# Patient Record
Sex: MALE | Race: WHITE
[De-identification: names, ages, dates, MRNs, and addresses within clinical notes are randomized per-mention and may not be internally consistent; named-entity substitution may affect disease eponyms.]

---

## 2018-06-09 ENCOUNTER — HOSPITAL ENCOUNTER (EMERGENCY)
Dept: HOSPITAL 25 - ED | Age: 28
Discharge: HOME | End: 2018-06-09
Payer: COMMERCIAL

## 2018-06-09 VITALS — DIASTOLIC BLOOD PRESSURE: 77 MMHG | SYSTOLIC BLOOD PRESSURE: 137 MMHG

## 2018-06-09 DIAGNOSIS — M25.561: Primary | ICD-10-CM

## 2018-06-09 PROCEDURE — 96372 THER/PROPH/DIAG INJ SC/IM: CPT

## 2018-06-09 PROCEDURE — 99282 EMERGENCY DEPT VISIT SF MDM: CPT

## 2018-06-09 NOTE — RAD
Indication: RIGHT knee pain post fall. Known anterior cruciate ligament tear.



Comparison: None.



Technique: RIGHT knee: AP, tunnel, crosstable lateral, sunrise views. 



Report: Negative for joint effusion, fracture, or malalignment. Preserved joint spaces.

Unremarkable soft tissue contours.



IMPRESSION:  Negative exam.

## 2018-07-06 ENCOUNTER — HOSPITAL ENCOUNTER (OUTPATIENT)
Dept: HOSPITAL 25 - OREAST | Age: 28
Discharge: HOME | End: 2018-07-06
Attending: ORTHOPAEDIC SURGERY
Payer: COMMERCIAL

## 2018-07-06 VITALS — SYSTOLIC BLOOD PRESSURE: 128 MMHG | DIASTOLIC BLOOD PRESSURE: 72 MMHG

## 2018-07-06 DIAGNOSIS — S83.511A: Primary | ICD-10-CM
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
## 2018-07-06 DIAGNOSIS — S83.281A: ICD-10-CM

## 2018-07-06 DIAGNOSIS — Z72.0: ICD-10-CM

## 2018-07-06 PROCEDURE — 88304 TISSUE EXAM BY PATHOLOGIST: CPT

## 2018-07-06 PROCEDURE — C1713 ANCHOR/SCREW BN/BN,TIS/BN: HCPCS

## 2018-07-08 NOTE — OP
CC:  PCP, [facility] *

 

DATE OF OPERATION:  07/06/18 - [facility]

 

DATE OF BIRTH:  [birth year]

 

SURGEON:  Rajiv Peters MD

 

ASSISTANT:  KENYON Cottrell.  An assistant was needed for the entirety of 
this case to help with positioning, retraction and was utilized throughout all 
portions of the case.

 

ANESTHESIOLOGIST:  Dr. Curiel.

 

ANESTHESIA:  General.

 

PRE-OP DIAGNOSIS:  Right knee anterior cruciate ligament grade 3 rupture.

 

POST-OP DIAGNOSIS:  Right knee grade 3 anterior cruciate ligament rupture with 
lateral meniscal tearing.

 

OPERATIVE PROCEDURE:

1.  Right knee anterior cruciate ligament reconstruction with BTB autograft.

2.  Partial lateral meniscectomy.

 

COMPLICATIONS:  None.

 

ESTIMATED BLOOD LOSS:  Minimal.

 

TOURNIQUET TIME:  24 minutes, 250 mmHg.

 

IMPLANTS USED:  Smith and Nephew SoftSilk 9 x 25 and 7 x 25 SoftSilk screws.

 

SPECIMEN:  None.

 

INDICATIONS:  Ange is a 27-year-old [employer] student who sustained injury to 
his knee several months ago.  He was diagnosed with an ACL rupture while he was 
living in [facility] and he was advised to undergo operative treatment.  He chose 
nonoperative treatment.  He was playing soccer and reinjured it a few weeks 
ago.  He has had persistent instability and elected to proceed with surgical 
treatment.  Risks and benefits of surgery were discussed at length to include 
but not limited to bleeding, infection, damage to nerves, vessels, surrounding 
structures, wound nonhealing, persistent pain, need for further surgery, 
scarring, stiffness, incomplete relief of symptoms, risk of anesthesia as well 
as risk of DVT.  He has elected to proceed.

 

DESCRIPTION OF PROCEDURE:  The patient was greeted in the preoperative area by 
the attending surgeon.  The correct extremity was marked and the consent was 
confirmed. The patient was then brought back to the operating suite, where he 
was placed in supine position on the operating table.  He then underwent 
general anesthesia with LMA intubation after which he was placed in the 
appropriate surgical position.  All bony prominences were padded and unsterile 
tourniquet was placed high in the proximal thigh.  The lateral post was 
positioned.  The right leg was prepped and draped in the usual sterile fashion 
beginning with chlorhexidine soap, scrub, and alcohol wipe and a final prep of 
ChloraPrep.

 

After appropriate surgical pause, indicating side, site, procedure, and 
administration of antibiotics, the limb was exsanguinated and the tourniquet 
inflated to 250 mmHg.  The knee was intra-articularly injected with 1% 
lidocaine with epi.  A midline incision was centered over the patellar tendon.  
Soft tissues were carefully dissected with attempt to preserve layers to expose 
the paratenon, which was incised for later closure and patellar tendon was then 
identified.  The width was about 36 mm, the central 10 mm were then harvested 
with a deep 10 blade. The dissection was taken proximally and the bone blocks 
were then harvested using a sagittal saw and the osteotome to allow for a 9 x 
24 mm femoral block as well as a 10 x 30 mm distal block.  The grafts were 
loosened without any difficulty.  They were prepared on the back table by the 
assistant.  While the graft had been prepped on the back table, the tendon was 
then closed with 0 Vicryl interrupted sutures by the attending surgeon.  The 
tourniquet was deflated.  At this point, the lateral portal was made through 
the capsule with the 11 blade.  Scope was brought into the joint.  Joint was 
examined.  There were grade 0 changes to the patellofemoral joint.  Medial and 
lateral gutters were intact without any loose bodies or debris. The scope was 
brought into the notch.  There was a fat pad that was apparent.  The 
anteromedial portal was made using 18 gauge with no localization to the 
capsule. Fat pad and ligamentum were removed using the shaver.  The ACL was 
completely ruptured off the femoral side with some scarring to the PCL.  A 
diagnostic arthroscopy was then done at this point.  The medial compartment was 
examined with grade 0 changes with the medial meniscus intact.  The lateral 
compartment was examined with the knee in figure-of-four.  There were grade 1 
changes in the femoral condyle and grade 0 to 1 changes in the plateau with 
fraying of the body of the meniscus as well as the root.  This was debrided 
back using the shaver.  Once the partial meniscectomy was done, attention was 
directed to the notch.  The knee was placed in 90 degrees.

 

At this point, the femoral portion was prepared in the usual fashion with 
electrocautery device.  There was a wide notch, and there was no need for a 
notchplasty.  A provisional tunnel was marked using a starting awl.  The scope 
was changed from the lateral to the medial portal to ensure visualization to 
make sure that there was appropriate reference point and to improve tunnel 
placement.  At this point, the attention was directed to the tibial tunnel with 
the tip-to-tip guide placed at about 52 degrees.  A drill bit was placed 
through the center of the ACL covered by the previous ACL fibers, the distance 
to the PCL as well as the posterior borders of the medial and lateral meniscus 
and the notch.  Once the appropriate position was identified, a 10 mm drill bit 
was then drilled unicortically with excellent purchase.  The tunnel was then 
rasped.  All excess bone was kept for later bone graft.  All excess debris was 
removed.  A curette was placed to prevent fluid egress.  At this point, the 
Smith and Nephew straight guidewire was placed for the femoral tunnel.  The 
knee was then hyperflexed by the assistant and the Beath pin was then placed 
through the center of the footprint using the previously placed dwayne as a 
reference point.  This was then overdrilled with a size 9 mm of low profile 
reamer to a depth of about 28 mm.  The tunnel was removed of all excess bone 
and loose debris.  The tunnel was then notched.  At this point, a #2 Ethibond 
suture was passed through the islet of the Beath pin and then passed through 
the femoral tunnel.  The suture loop was then passed antegrade to the tibial 
tunnel.

 

The graft, which was placed on gentle tension on the back table were wrapped in 
a saline soaked gauze, was then brought to the operating field and then passed 
under arthroscopic direct visualization until it was well seated in the femoral 
tunnel. Nitinol wire was then used with tension in the femoral tunnel and 
tibial tunnel sutures.  The 7 x 25 mm screw was placed with excellent purchase.
  Images were obtained.  The knee was placed in full extension, found to not 
impinge anteriorly. The knee was then cycled approximately 15 times with no 
evidence of shuck or creep from the graft.  No evidence of loosening.  At this 
point, the graft was visualized with the scope in the joint again to make sure 
that there was no movement.  The knee was then placed in gentle flexion about 
20 degrees with a slight posterior drawer tension on the tibial sutures.  A 
size 9 x 25 mm screw was then placed with excellent purchase.  At this point, 
the knee was taken through full range of motion and Lachman was assessed and 
found to be stable.  The scope was brought back into the joint.  The joint was 
examined and the ACL was found to be in good position. The wounds were 
copiously irrigated with sterile saline.  The scope was completed. The 
superficial wounds were irrigated thoroughly.  The bone graft was placed in the 
patellar defect and oversewn with 0 Vicryl.  Paratenon was closed using 2-0 
Vicryl in a running fashion.  The wounds were irrigated again.  The skin was 
closed utilizing 2-0 Vicryl and 3-0 Monocryl.  Sterile dressings were applied.  
The knee was intra-articularly injected with 0.25% Marcaine plain.  A Cryo/Cuff 
as well as a hinged knee brace locked at 0 degrees.  The patient was awoken 
from anesthesia and transferred to PACU in stable condition.

 

POSTOPERATIVE PLAN:  He will be weightbearing as tolerated.  He will start 
physical therapy on Monday.  He will be discharged with pain medications and 
antibiotics. DVT prophylaxis was considered but deferred due to no previous 
personal or family history.  I will see the patient back in 6 to 8 days.

 

 [id number]/CPS #: [id number]

MTDD